# Patient Record
Sex: FEMALE | URBAN - METROPOLITAN AREA
[De-identification: names, ages, dates, MRNs, and addresses within clinical notes are randomized per-mention and may not be internally consistent; named-entity substitution may affect disease eponyms.]

---

## 2017-03-20 ENCOUNTER — AMBULATORY - HEALTHEAST (OUTPATIENT)
Dept: MULTI SPECIALTY CLINIC | Facility: CLINIC | Age: 27
End: 2017-03-20

## 2017-03-20 LAB
HPV_EXT - HISTORICAL: NORMAL
PAP SMEAR - HIM PATIENT REPORTED: NORMAL

## 2018-01-02 ENCOUNTER — RECORDS - HEALTHEAST (OUTPATIENT)
Dept: ADMINISTRATIVE | Facility: OTHER | Age: 28
End: 2018-01-02

## 2018-03-19 ENCOUNTER — RECORDS - HEALTHEAST (OUTPATIENT)
Dept: ADMINISTRATIVE | Facility: OTHER | Age: 28
End: 2018-03-19

## 2018-03-20 ENCOUNTER — COMMUNICATION - HEALTHEAST (OUTPATIENT)
Dept: ADMINISTRATIVE | Facility: CLINIC | Age: 28
End: 2018-03-20

## 2018-03-20 DIAGNOSIS — E05.90 HYPERTHYROIDISM: ICD-10-CM

## 2018-05-10 LAB
HGB_EXT (HISTORICAL CONVERSION): 11.1
PLT_EXT - HISTORICAL: 306

## 2018-05-18 ENCOUNTER — HOSPITAL ENCOUNTER (OUTPATIENT)
Dept: MEDSURG UNIT | Facility: CLINIC | Age: 28
Discharge: HOME | End: 2018-05-18
Attending: ADVANCED PRACTICE MIDWIFE | Admitting: ADVANCED PRACTICE MIDWIFE

## 2018-07-06 LAB — GBS_EXT (HISTORICAL CONVERSION): NEGATIVE

## 2018-07-10 ENCOUNTER — COMMUNICATION - HEALTHEAST (OUTPATIENT)
Dept: OBGYN | Facility: CLINIC | Age: 28
End: 2018-07-10

## 2018-07-10 ENCOUNTER — COMMUNICATION - HEALTHEAST (OUTPATIENT)
Dept: SCHEDULING | Facility: CLINIC | Age: 28
End: 2018-07-10

## 2018-07-16 ENCOUNTER — HOSPITAL ENCOUNTER (OUTPATIENT)
Dept: MEDSURG UNIT | Facility: CLINIC | Age: 28
Discharge: HOME | End: 2018-07-16
Attending: ADVANCED PRACTICE MIDWIFE | Admitting: ADVANCED PRACTICE MIDWIFE

## 2018-07-16 LAB
ABO/RH(D): NORMAL
ALBUMIN UR-MCNC: NEGATIVE MG/DL
ALT SERPL W P-5'-P-CCNC: 18 U/L (ref 0–45)
ANTIBODY SCREEN: NORMAL
APTT PPP: 25 SECONDS (ref 24–37)
AST SERPL W P-5'-P-CCNC: 20 U/L (ref 0–40)
CREAT SERPL-MCNC: 0.58 MG/DL (ref 0.6–1.1)
CREAT UR-MCNC: 21.8 MG/DL
ERYTHROCYTE [DISTWIDTH] IN BLOOD BY AUTOMATED COUNT: 13.4 % (ref 11–14.5)
GFR SERPL CREATININE-BSD FRML MDRD: >60 ML/MIN/1.73M2
GLUCOSE UR STRIP-MCNC: NEGATIVE MG/DL
HCT VFR BLD AUTO: 30.6 % (ref 35–47)
HGB BLD-MCNC: 10.5 G/DL (ref 12–16)
INR PPP: 0.9 (ref 0.9–1.1)
KETONES UR STRIP-MCNC: NEGATIVE MG/DL
MCH RBC QN AUTO: 30.8 PG (ref 27–34)
MCHC RBC AUTO-ENTMCNC: 34.3 G/DL (ref 32–36)
MCV RBC AUTO: 90 FL (ref 80–100)
PLATELET # BLD AUTO: 314 THOU/UL (ref 140–440)
PMV BLD AUTO: 9.6 FL (ref 8.5–12.5)
PROTEIN, RANDOM URINE - HISTORICAL: <7 MG/DL
PROTEIN/CREAT RATIO, RANDOM UR: NORMAL
RBC # BLD AUTO: 3.41 MILL/UL (ref 3.8–5.4)
URATE SERPL-MCNC: 3.3 MG/DL (ref 2–7.5)
WBC: 13 THOU/UL (ref 4–11)

## 2018-08-13 ENCOUNTER — COMMUNICATION - HEALTHEAST (OUTPATIENT)
Dept: OBGYN | Facility: CLINIC | Age: 28
End: 2018-08-13

## 2019-01-03 ENCOUNTER — RECORDS - HEALTHEAST (OUTPATIENT)
Dept: ADMINISTRATIVE | Facility: OTHER | Age: 29
End: 2019-01-03

## 2019-01-30 ENCOUNTER — RECORDS - HEALTHEAST (OUTPATIENT)
Dept: ADMINISTRATIVE | Facility: OTHER | Age: 29
End: 2019-01-30

## 2019-05-08 ENCOUNTER — RECORDS - HEALTHEAST (OUTPATIENT)
Dept: ADMINISTRATIVE | Facility: OTHER | Age: 29
End: 2019-05-08

## 2019-05-22 ENCOUNTER — RECORDS - HEALTHEAST (OUTPATIENT)
Dept: ADMINISTRATIVE | Facility: OTHER | Age: 29
End: 2019-05-22

## 2019-05-22 LAB
ABORH_EXT (HISTORICAL CONVERSION): NORMAL
ANTIBODY_EXT (HISTORICAL CONVERSION): NEGATIVE
HBSAG_EXT (HISTORICAL CONVERSION): NEGATIVE
HGB_EXT (HISTORICAL CONVERSION): 10.3
HIV_EXT: NEGATIVE
PLT_EXT - HISTORICAL: 287
RPR - HISTORICAL: NORMAL
RUBELLA_EXT (HISTORICAL CONVERSION): NORMAL

## 2019-05-30 ENCOUNTER — RECORDS - HEALTHEAST (OUTPATIENT)
Dept: ADMINISTRATIVE | Facility: OTHER | Age: 29
End: 2019-05-30

## 2019-07-02 ENCOUNTER — RECORDS - HEALTHEAST (OUTPATIENT)
Dept: ADMINISTRATIVE | Facility: OTHER | Age: 29
End: 2019-07-02

## 2019-07-10 ENCOUNTER — RECORDS - HEALTHEAST (OUTPATIENT)
Dept: ADMINISTRATIVE | Facility: OTHER | Age: 29
End: 2019-07-10

## 2019-07-30 ENCOUNTER — RECORDS - HEALTHEAST (OUTPATIENT)
Dept: ADMINISTRATIVE | Facility: OTHER | Age: 29
End: 2019-07-30

## 2019-08-03 ENCOUNTER — RECORDS - HEALTHEAST (OUTPATIENT)
Dept: ADMINISTRATIVE | Facility: OTHER | Age: 29
End: 2019-08-03

## 2019-08-03 ENCOUNTER — COMMUNICATION - HEALTHEAST (OUTPATIENT)
Dept: SCHEDULING | Facility: CLINIC | Age: 29
End: 2019-08-03

## 2019-08-08 ENCOUNTER — RECORDS - HEALTHEAST (OUTPATIENT)
Dept: ADMINISTRATIVE | Facility: OTHER | Age: 29
End: 2019-08-08

## 2019-09-16 ENCOUNTER — RECORDS - HEALTHEAST (OUTPATIENT)
Dept: ADMINISTRATIVE | Facility: OTHER | Age: 29
End: 2019-09-16

## 2019-09-16 LAB
ABSCREEN_EXT - HISTORICAL: NEGATIVE
HGB_EXT (HISTORICAL CONVERSION): 10.7
PLT_EXT - HISTORICAL: 329

## 2019-10-07 ENCOUNTER — RECORDS - HEALTHEAST (OUTPATIENT)
Dept: ADMINISTRATIVE | Facility: OTHER | Age: 29
End: 2019-10-07

## 2019-10-24 ENCOUNTER — RECORDS - HEALTHEAST (OUTPATIENT)
Dept: ADMINISTRATIVE | Facility: OTHER | Age: 29
End: 2019-10-24

## 2019-11-04 ENCOUNTER — RECORDS - HEALTHEAST (OUTPATIENT)
Dept: ADMINISTRATIVE | Facility: OTHER | Age: 29
End: 2019-11-04

## 2019-11-11 ENCOUNTER — COMMUNICATION - HEALTHEAST (OUTPATIENT)
Dept: ADMINISTRATIVE | Facility: CLINIC | Age: 29
End: 2019-11-11

## 2019-11-18 ENCOUNTER — PRENATAL OFFICE VISIT - HEALTHEAST (OUTPATIENT)
Dept: MIDWIFE SERVICES | Facility: CLINIC | Age: 29
End: 2019-11-18

## 2019-11-18 DIAGNOSIS — Z87.59 HISTORY OF KIDNEY INFECTION DURING PREGNANCY: ICD-10-CM

## 2019-11-18 DIAGNOSIS — Z87.440 HISTORY OF KIDNEY INFECTION DURING PREGNANCY: ICD-10-CM

## 2019-11-18 DIAGNOSIS — O21.9 NAUSEA/VOMITING IN PREGNANCY: ICD-10-CM

## 2019-11-18 DIAGNOSIS — Z67.91 RH NEGATIVE STATE IN ANTEPARTUM PERIOD: ICD-10-CM

## 2019-11-18 DIAGNOSIS — R82.5 POSITIVE URINE DRUG SCREEN: ICD-10-CM

## 2019-11-18 DIAGNOSIS — A60.00 GENITAL HERPES SIMPLEX, UNSPECIFIED SITE: ICD-10-CM

## 2019-11-18 DIAGNOSIS — Z34.83 ENCOUNTER FOR SUPERVISION OF OTHER NORMAL PREGNANCY IN THIRD TRIMESTER: ICD-10-CM

## 2019-11-18 DIAGNOSIS — F41.8 DEPRESSION WITH ANXIETY: ICD-10-CM

## 2019-11-18 DIAGNOSIS — Z87.59 HISTORY OF SEVERE PRE-ECLAMPSIA: ICD-10-CM

## 2019-11-18 DIAGNOSIS — E05.90 HYPERTHYROIDISM: ICD-10-CM

## 2019-11-18 DIAGNOSIS — Z34.80 SUPERVISION OF OTHER NORMAL PREGNANCY, ANTEPARTUM: ICD-10-CM

## 2019-11-18 DIAGNOSIS — O26.899 RH NEGATIVE STATE IN ANTEPARTUM PERIOD: ICD-10-CM

## 2019-11-18 LAB — HGB BLD-MCNC: 10.6 G/DL (ref 12–16)

## 2019-11-18 RX ORDER — ECHINACEA 400 MG
CAPSULE ORAL
Status: SHIPPED | COMMUNITY
Start: 2019-11-18

## 2019-11-18 RX ORDER — VALACYCLOVIR HYDROCHLORIDE 500 MG/1
500 TABLET, FILM COATED ORAL
Status: SHIPPED | COMMUNITY
Start: 2017-07-14

## 2019-11-18 ASSESSMENT — MIFFLIN-ST. JEOR: SCORE: 1310.09

## 2019-11-18 ASSESSMENT — EDINBURGH POSTNATAL DEPRESSION SCALE (EPDS): TOTAL SCORE: 6

## 2019-11-19 ENCOUNTER — COMMUNICATION - HEALTHEAST (OUTPATIENT)
Dept: MIDWIFE SERVICES | Facility: CLINIC | Age: 29
End: 2019-11-19

## 2019-11-19 LAB
ALLERGIC TO PENICILLIN: NO
BACTERIA SPEC CULT: NO GROWTH
C TRACH DNA SPEC QL PROBE+SIG AMP: NEGATIVE
COLLECTION METHOD: NORMAL
GP B STREP DNA SPEC QL NAA+PROBE: NEGATIVE
HCV AB SERPL QL IA: NEGATIVE
LEAD BLD-MCNC: NORMAL UG/DL
N GONORRHOEA DNA SPEC QL NAA+PROBE: NEGATIVE

## 2019-11-20 LAB — LEAD BLDV-MCNC: <2 UG/DL (ref 0–4.9)

## 2019-11-26 ENCOUNTER — PRENATAL OFFICE VISIT - HEALTHEAST (OUTPATIENT)
Dept: MIDWIFE SERVICES | Facility: CLINIC | Age: 29
End: 2019-11-26

## 2019-11-26 DIAGNOSIS — Z34.83 ENCOUNTER FOR SUPERVISION OF OTHER NORMAL PREGNANCY IN THIRD TRIMESTER: ICD-10-CM

## 2019-11-26 DIAGNOSIS — E05.90 HYPERTHYROIDISM: ICD-10-CM

## 2019-11-26 DIAGNOSIS — O99.013 ANEMIA AFFECTING PREGNANCY IN THIRD TRIMESTER: ICD-10-CM

## 2019-11-26 DIAGNOSIS — F19.91 HISTORY OF DRUG USE: ICD-10-CM

## 2019-11-26 LAB
AMPHETAMINES UR QL SCN: NORMAL
BARBITURATES UR QL: NORMAL
BENZODIAZ UR QL: NORMAL
CANNABINOIDS UR QL SCN: NORMAL
COCAINE UR QL: NORMAL
CREAT UR-MCNC: 54.4 MG/DL
METHADONE UR QL SCN: NORMAL
OPIATES UR QL SCN: NORMAL
OXYCODONE UR QL: NORMAL
PCP UR QL SCN: NORMAL

## 2019-11-26 ASSESSMENT — MIFFLIN-ST. JEOR: SCORE: 1323.7

## 2020-01-05 ENCOUNTER — COMMUNICATION - HEALTHEAST (OUTPATIENT)
Dept: MIDWIFE SERVICES | Facility: CLINIC | Age: 30
End: 2020-01-05

## 2020-01-05 DIAGNOSIS — A60.00 GENITAL HERPES SIMPLEX, UNSPECIFIED SITE: ICD-10-CM

## 2021-06-01 VITALS — BODY MASS INDEX: 20.94 KG/M2 | WEIGHT: 122 LBS

## 2021-06-03 VITALS
HEIGHT: 63 IN | HEART RATE: 84 BPM | BODY MASS INDEX: 24.45 KG/M2 | SYSTOLIC BLOOD PRESSURE: 118 MMHG | WEIGHT: 138 LBS | DIASTOLIC BLOOD PRESSURE: 64 MMHG

## 2021-06-03 NOTE — PATIENT INSTRUCTIONS - HE
[facility] Nurse Midwives - Contact information:  Appointment line and to get a hold of CNM in clinic Monday-Friday 8 am - 5 pm:  [phone].  There are some clinics with early start times (1st appointment 7:40 am) and others with evening hours (last appointment 6:20 pm).  Most are typically open from 8 am to 5 pm.    CNM on call answering service: [phone].  Specify your hospital of choice and leave a brief message for CNM;  will then page CNM who is on call at your specified hospital and you should receive a call back with 15 minutes.  Be sure that your ringer is audible and that you can accept blocked calls so that we can get back in touch with you! This number should be reserved for urgent needs if during the day, before 8 am, after 5 pm, weekends, holidays.    Contact the on-call CNM with warning signs, such as:    vaginal bleeding     Vaginal discharge and itching or pain and burning during urination    Leg/calf pain or swelling on one side    severe abdominal pain    nausea and vomiting more than 4-5 times a day, or if you are unable to keep anything down    fever more than 100.4 degrees F.         You are invited to  Meet the [facility] Nurse-Midwives  A way to tour the hospital Labor and Delivery unit and meet the midwives that attend births since you may not have the opportunity to meet them during your prenatal care.  Some sessions are informal meet and greet type social hours, others address a specific concern or topic.    Thursday, Februrary 22, 2018 7-8pm  [facility]  Social Hour    Thursday, April 19, 2018 7-8pm  [facility]  Exercise, nutrition and weight gain    Tuesday, June 28, 2018 7-8pm  [facility]  Postpartum depression/anxiety    Thursday August 23, 2018 7-8 pm  [facility], [facility]  Physiology of birth and breastfeeding, Pediatrician presentation    Thursday October 18,  2018  7-8pm,  [facility], [facility]  Social Hour    Please call [phone] to register        Touring the Maternity Care Center  To schedule a tour at either [place] or [facility], please do so online using the following links:  [facility] - https://www.CivilisedMoney/registerlist.asp?s=6&m=303&vs=5&p=2&bdzcp=644&ps=1&group=37&it=1&bcw=050  [facility] - https://www.CivilisedMoney/registerlist.asp?s=6&m=303&vs=5&p=2&ctwfk=759&ps=1&group=38&it=1&pdb=476    Childbirth and Parenting Education:   [facility]: http://DriverdoManhattan Eye, Ear and Throat HospitalIntellinX/   [phone] [phone]  [place]: (education, yoga & wellness) www.N-Dimension [employer]  Enlightened Mama: www.VidPayenedmama.Pre[employer]se Technologies   Childbirth collective: (Parent topic nights)  www.childbirthcollective.org/  Hypnobabies:  www.hypnobabiestwincities.com/  Hypnobirthing:  Http://hypnobirthing.com/    Breastfeeding Information:  An excellent 15-minute video on preparing for a good breastfeeding experience, including how to ensure you have a good milk supply and a comfortable latch, can be found here:  https://MakerCraft.[employer]/      Book Recommendations:   Yessica Cristela's Birthing From [facility]--first few chapters include a new-age tone, you may prefer to skip it and keep going, because there is good stuff later.  This book recommendation covers emotional preparation, but does cover coping with pain, and use of both pharmacological and nonpharmacological methods.    Dr. Prieto' The Pregnancy Book and The Birth Book--the pregnancy book goes month-by month    Womanly Art of Breastfeeding by La Leche [employer] International   Bestfeeding by Adenike Carter--great pictures    Mothering Your Nursing Toddler, by Maggie Wilson.   Addresses dealing with so many of the challenging behaviors of a nursing toddler.  How Weaning Happens, by La Leche League.  Discusses weaning at all ages, from medically necessary weaning of an infant, all the way up to age 5 (or  "older), with why/why not, and strategies.  Very empowering book both for deciding to wean and deciding not to.    American College of Nurse-Midwives (ACNM) http://www.midwife.org/; look at the informational handouts at http://www.midwife.org/Share-With-Women     www.mymidwife.org    Mother to Baby (Medication and Herbal guidance in pregnancy): http://www.mothertobaby.org  Toll-Free Hotline: 740.950.9856  LactMed (Medication use while breastfeeding): http://toxnet.nlm.nih.gov/newtoxnet/lactmed.htm    Women's Health.gov:  http://www.womenshealth.gov/a-z-topics/index.html    American pregnancy association - http://americanpregnancy.org    Centering Pregnancy (group prenatal care option): http://centeringhealthcare.org    Information about doulas:  Childbirth collective: http://www.childbirthcollective.org/  Doulas of [place] (DILAN):  www.dilan.org  ShareThis [facility]  project: http://Telemedicine Cliniccitiesdoulaproject.com/     Early Childhood and Family Education (ECFE):  ECFE offers parents hands-on learning experiences that will nourish a lifetime of teachable moments.  http://ecfe.info/ecfe-home/     [place] www.Qzzr.Allurent     FDA - Nutrition  www.mypyramid.gov  Under \"For Consumers,\" click on \"pregnant and breastfeeding women.\"      Centers for Disease Control and Prevention (CDC) - Vaccines : http://www.cdc.gov/vaccines/       When researching information on the web, question the validity of websites.  The domains .gov, .edu and.org tend to be more reliable information.  If there are a lot of advertisements, be cautious of the information provided. Stay away from blogs and chat rooms please!      Screening Program  Http://www.health.[facility].[place].us/newbornscreening/  [place] newborns are tested soon after birth for more than 50 hidden, rare disorders, including hearing loss and critical congenital heart disease (CCHD). This site provides resources and information for families and providers.    HEALTHY " PREGNANCY CARE: 37 to 41 WEEKS PREGNANT    Talk with your midwife or physician about when to call with signs of labor    Regular uterine contractions that are getting closer together and/or stronger    If you think your water has broken or is leaking    Bleeding from the vagina like a period (bloody vaginal discharge is normal)    If you are not feeling your baby move    Make plans for transportation and  as needed for when you are going to the hospital.    Your midwife or physician may offer to check your cervix for changes.     Ask your health care provider about vaccinations you may need following delivery. By now, you should have received a Tdap immunization to protect against pertussis or whooping cough. Fathers and family members who will be in close contact with the baby should also receive a Tdap shot at least two weeks before the expected birth of the baby if they have not had a Td (tetanus) shot for at least two years.    If you are past your due date, discuss the next steps leading to delivery with your midwife or physician. If you don't start labor on your own by 41 or 42 weeks, your midwife or physician may recommend giving you medicines to ripen your cervix and start labor.  Induction of labor: http://onlinelibrary.callejas.com/store/10.1016/j.jmwh.2008.04.018/asset/j.jmwh.2008.04.018.pdf?v=1&t=misd0lhe&w=63fe105q9gm11n16v0l8rt9w520340v3gf1xa688    Preparing for your baby: Tell your midwife or physician how you plan to feed your baby (breast or bottle), who you have chosen to do pediatric care for your baby, and if you have a boy, whether you have chosen to have him circumcised. You will need a car seat correctly installed in your vehicle to bring your baby home. As you start to set up the nursery at home for your baby, make sure the crib is safe. The mattress needs to fit snugly against the edges of the crib. If you can fit a soda can between the bars, they are too far apart and can allow the  baby's head to caught between them.    Learn about infant care and feeding, including information about infant CPR. We recommend that you put your baby to sleep on his or her back to reduce the chance of Sudden Infant Death Syndrome (SIDS). To maintain a healthy environment in which your child can grow, it's best to keep your home smoke-free. By preparing ahead, your transition into parenthood will go smoothly for you and your baby.    Your midwife or physician will want to see you for a checkup 2 to 6 weeks after delivery.    If you have questions about any symptoms you are experiencing or any other concerns, call your provider or their clinic staff at [facility] at Phone: [phone]. If it's after clinic hours, physician patients should call the Care Connection at [phone] ([phone]); midwife patients should call their answering service at [phone].    How can you care for yourself at home?   You can refer to the Starting Out Right book or find it online at http://www.healtheast.org/images/stories/maternity/[facility]-Starting-Out-Right.pdf or http://www.[facility]Nor[facility].org/images/stories/flipbooks/[facility]-starting-out-right/[facility]-starting-out-right.html#p=8     You can sign up for a weekly parenting e-mail that gives support, tips and advice from health care professionals that starts with pregnancy and continues through the toddler years. To register, go to www.healthNor-[facility].org/baby at any time during your pregnancy.        Making Plans for Feeding My Baby    By this point, you probably have read a lot about feeding your baby.  Breastfeed or formula? Each mother s decision is her own and [facility] respects you and your choices. We ve gathered information on both breastfeeding and formula feeding to help with your decision. Talking with your physician or nurse-midwife can also help in your decision.  However you plan to feed your baby, [facility] Maternity Care Centers encourage  rooming in with your baby, skin-to-skin contact and feeding your baby based on his or her cues.    Skin-to-skin contact  Being close to mom helps your baby adjust to life outside of the womb.  It helps your baby regulate their body temperature, heart rate, and breathing.  Your baby will usually be placed skin-to-skin immediately following birth or as soon as possible, if medical intervention is needed.    Rooming-In  Having your baby stay with you in your room is called  rooming-in .  Keeping your baby in your room helps you to learn how to care for your baby by getting to know your baby s cues, body rhythms and sleep cycle.       Cue-based feeding  Cues (signals) are baby s way of telling you what he or she wants.  When you learn your infant s cues, you know how to care for and feed your baby.   Feeding cues are the licking and smacking of lips, bringing their fist to their mouth, and a reflex called  rooting - where baby turns and opens his or her mouth, searching for the breast or bottle.  Crying is a late feeding cue.  Babies can feed frequently, often at least 8 times in 24 hours.    Breastfeeding facts  Breast milk is the best source of nutrition for your baby and is available at birth. In the first couple of days, your milk volume is already starting to increase, though it may not be noticeable. Breastfeed frequently to increase your milk supply. Within three to five days, you will begin to notice larger milk volumes. An increase in breast size, heaviness and firmness are often described as the milk  coming in.  Frequent breastfeeding can help breasts from getting overly firm and painful. You will know the baby is getting enough milk if your baby is having wet and dirty diapers and gaining weight.     If your goal is to exclusively breastfeed, it is important to not use any formula or artificial nipples (including bottles and pacifiers) while your baby is learning to breastfeed.  While it may seem like an   easy  option to give your baby a bottle, formula should only be given if there is a medical reason for your baby to have it.      Positioning and attachment   Get comfortable.  Use pillows as needed to support your arms and baby.  Hold baby close at the level of your breast, facing you in a tummy to tummy position.  Skin to skin helps with this.  Position the baby with his or her nose by the nipple.  There should be a straight line from baby s ear to shoulder to hips.  Tickle your baby s lips or wait for baby to open mouth wide, bring baby to breast by leading with the chin.  Aim the nipple at the roof of baby s mouth.  A rapid sucking pattern is followed by longer, drawing pattern with occasional swallows heard.  When baby is correctly latched, your nipple and much of the areola are pulled well into baby s mouth.      Returning to work or school  Focus on a good start to breastfeeding.  Many women continue to provide breastmilk for their baby when they return to work or school.  Making plans about where to pump and store milk can make the transition go well.  Talk with other mothers who have also returned to work or school for tips and support.  Your employer s Human Resource department may be a resource as well.     Returning to work or school: (continued)     babies can mean fewer  sick  days for you.    A quality breast pump will also save time and add comfort.  Check with your insurance prior to giving birth for breast pump coverage.  Many insurance companies include a pump within your benefits.    Wait until your baby is at least three weeks old to introduce a bottle for the first time.  Have someone besides you give the bottle.    Breastfeed when you are with your baby. Reserve your bottles of breast milk for when you are away.     Your breasts will need to be  emptied  either by your baby or a pump.  Plan to pump at least twice in an eight hour day.    If you cannot pump at work, continue  breastfeeding at home. Any amount of breast milk is worth giving to your baby.    Formula feeding facts  If you are planning to use formula to feed your baby, you will want to make some preparations ahead of time. Talk to your doctor or nurse-midwife about what type of formula to use. Some are iron-fortified, meaning they have extra iron in them. You will want to purchase formula and bottles before your baby is born to be sure you are ready after you return from the hospital. The [facility] do not provide formula samples to take home.    Be sure to follow formula mixing directions closely. Regular milk in the dairy case at the grocery store should not be given to babies under 1 year old. Baby formula is sold in several forms including:    Ready-to-use. This is the most expensive, but no mixing is necessary.    Concentrated liquid. This is less expensive than ready-to-use and you mix with water.    Powder. This is the least expensive. You mix one level scoop of powdered formula with two ounces of water and stir well.    Most babies need 2.5 ounces of formula per pound of body weight each day. This means an 8-pound baby may drink about 20 ounces of formula a day; however, this is just an estimate. The most important thing is to pay attention to your baby s cues.  If your baby is always fussy, needs more iron or has certain food allergies, your physician may suggest you change your baby s formula to a different kind.     How do I warm my baby s bottles?  You may feed your baby a bottle without warming it first. It is OK for the breast milk or formula to be cool or room temperature. If your baby seems to prefer it warmed, you can put the filled bottle in a container of warm water and let it stand for a few minutes. Check the temperature of the liquid on your skin before feeding it to your baby; to be sure it isn t too hot. Do not heat bottles in the microwave. Microwaves heat food and liquids unevenly, and  this can cause hot spots that can burn your baby.    How do I clean and sterilize bottles?  Sterilize bottles and nipples before you use them for the first time. You can do this by putting them in boiling water for 5 minutes. After that first time, you can wash them in hot and soapy water. Rinse them carefully to be sure there is no soap left on them. You can also wash them in the .    Care Connection [phone] ([phone])    Baby Café    Pregnant and interested in breastfeeding?  Need answers to breastfeeding questions?  Want to help breastfeeding moms?  Already breastfeeding and want to meet other moms?    Join us at the Baby Café!    Baby Cafe is a free, drop-in service offering breast-feeding support for pregnant women, breast-feeding mothers and their families.  Come share tips and socialize with other mothers.  Babies and siblings are welcome (no childcare available).    Starting April 2018, Baby Café will be at 4 locations.  Please see below for the Baby Café closest to you!      [facility]  [address] [place] [address]  1st Wednesday: 10am-12pm    [facility]  [address] [place] [address]  3rd Wednesday 4-6pm    [facility]  [address] [place] [address]  4th Wednesday 10am-12:30pm    [facility]  [address] [place] [address]  4th Wednesdays: 4-6pm      Hmong, Omani, and [place]er which is may be available at some sites.    For more information, please contact: Alma [email or web address] [email or web address]. or [phone]

## 2021-06-03 NOTE — TELEPHONE ENCOUNTER
Previous clinic: didn't ask  [facility] CNM appointment date & location: 11/18 WBY  Weeks gestation at appointment: 36 wks  How will records be sent to the clinic?: faxed  Phone number where you may be reached: pt knows we must receive her records by EOD today 11/11/2019

## 2021-06-03 NOTE — PATIENT INSTRUCTIONS - HE
[facility] Nurse Midwives - Contact information:  Appointment line and to get a hold of CNM in clinic Monday-Friday 8 am - 5 pm:  [phone].  There are some clinics with early start times (1st appointment 7:40 am) and others with evening hours (last appointment 6:20 pm).  Most are typically open from 8 am to 5 pm.    CNM on call answering service: [phone].  Specify your hospital of choice and leave a brief message for CNM;  will then page CNM who is on call at your specified hospital and you should receive a call back with 15 minutes.  Be sure that your ringer is audible and that you can accept blocked calls so that we can get back in touch with you! This number should be reserved for urgent needs if during the day, before 8 am, after 5 pm, weekends, holidays.    Contact the on-call CNM with warning signs, such as:    vaginal bleeding     Vaginal discharge and itching or pain and burning during urination    Leg/calf pain or swelling on one side    severe abdominal pain    nausea and vomiting more than 4-5 times a day, or if you are unable to keep anything down    fever more than 100.4 degrees F.         You are invited to  Meet the [facility] Nurse-Midwives  A way to tour the hospital Labor and Delivery unit and meet the midwives that attend births since you may not have the opportunity to meet them during your prenatal care.  Some sessions are informal meet and greet type social hours, others address a specific concern or topic.    Thursday, Februrary 22, 2018 7-8pm  [facility]  Social Hour    Thursday, April 19, 2018 7-8pm  [facility] [place] [facility], [facility] A  Exercise, nutrition and weight gain    Tuesday, June 28, 2018 7-8pm  [facility]  Postpartum depression/anxiety    Thursday August 23, 2018 7-8 pm  [facility] [place] [facility], Auditorum A  Physiology of birth and breastfeeding, Pediatrician presentation    Thursday October 18,  2018  7-8pm,  [facility], Auditorium A  Social Hour    Please call [phone] to register        Touring the Maternity Care Center  To schedule a tour at either [place] or [facility], please do so online using the following links:  [facility] - [email or web address]  [facility] - [email or web address]    Childbirth and Parenting Education:   [facility]: http://CipherGraph NetworksConey Island HospitalJag.ag/   [phone] [phone]  [place]: (education, yoga & wellness) www.Medafor  Enlightened Mama: www.InviBoxenedmama.fypio   Childbirth collective: (Parent topic nights)  www.childbirthcollective.org/  Hypnobabies:  www.hypnobabiestwincities.com/  Hypnobirthing:  Http://hypnobirthing.com/    Breastfeeding Information:  An excellent 15-minute video on preparing for a good breastfeeding experience, including how to ensure you have a good milk supply and a comfortable latch, can be found here:  https://ChipRewards.fypio/      Book Recommendations:   Yessica Cristela's Birthing From [facility]--first few chapters include a new-age tone, you may prefer to skip it and keep going, because there is good stuff later.  This book recommendation covers emotional preparation, but does cover coping with pain, and use of both pharmacological and nonpharmacological methods.    Dr. Prieto' The Pregnancy Book and The Birth Book--the pregnancy book goes month-by month    Womanly Art of Breastfeeding by La Leche League International   Bestfeeding by Adenike Carter--great pictures    Mothering Your Nursing Toddler, by Maggie Wilson.   Addresses dealing with so many of the challenging behaviors of a nursing toddler.  How Weaning Happens, by La Leche League.  Discusses weaning at all ages, from medically necessary weaning of an infant, all the way up to age 5 (or  "older), with why/why not, and strategies.  Very empowering book both for deciding to wean and deciding not to.    American College of Nurse-Midwives (ACNM) http://www.midwife.org/; look at the informational handouts at http://www.midwife.org/Share-With-Women     www.mymidwife.org    Mother to Baby (Medication and Herbal guidance in pregnancy): http://www.mothertobaby.org  Toll-Free Hotline: 704.636.3649  LactMed (Medication use while breastfeeding): http://toxnet.nlm.nih.gov/newtoxnet/lactmed.htm    Women's Health.gov:  http://www.womenshealth.gov/a-z-topics/index.html    American pregnancy association - http://americanpregnancy.org    Centering Pregnancy (group prenatal care option): http://centeringhealthcare.org    Information about doulas:  Childbirth collective: http://www.childbirthcollective.org/  Doulas of [place] (DILAN):  www.dilan.org  [facility]  project: http://Clever Cloudcitiesdoulaproject.com/     Early Childhood and Family Education (ECFE):  ECFE offers parents hands-on learning experiences that will nourish a lifetime of teachable moments.  http://ecfe.info/ecfe-home/     [place] www.Gymtrack.[employer]     FDA - Nutrition  www.mypyramid.gov  Under \"For Consumers,\" click on \"pregnant and breastfeeding women.\"      Centers for Disease Control and Prevention (CDC) - Vaccines : http://www.cdc.gov/vaccines/       When researching information on the web, question the validity of websites.  The domains .gov, .edu and.org tend to be more reliable information.  If there are a lot of advertisements, be cautious of the information provided. Stay away from blogs and chat rooms please!      Screening Program  Http://www.health.[facility].[place].us/newbornscreening/  [place] newborns are tested soon after birth for more than 50 hidden, rare disorders, including hearing loss and critical congenital heart disease (CCHD). This site provides resources and information for families and providers.    HEALTHY " PREGNANCY CARE: 37 to 41 WEEKS PREGNANT    Talk with your midwife or physician about when to call with signs of labor    Regular uterine contractions that are getting closer together and/or stronger    If you think your water has broken or is leaking    Bleeding from the vagina like a period (bloody vaginal discharge is normal)    If you are not feeling your baby move    Make plans for transportation and  as needed for when you are going to the hospital.    Your midwife or physician may offer to check your cervix for changes.     Ask your health care provider about vaccinations you may need following delivery. By now, you should have received a Tdap immunization to protect against pertussis or whooping cough. Fathers and family members who will be in close contact with the baby should also receive a Tdap shot at least two weeks before the expected birth of the baby if they have not had a Td (tetanus) shot for at least two years.    If you are past your due date, discuss the next steps leading to delivery with your midwife or physician. If you don't start labor on your own by 41 or 42 weeks, your midwife or physician may recommend giving you medicines to ripen your cervix and start labor.  Induction of labor: http://onlinelibrary.callejas.com/store/10.1016/j.jmwh.2008.04.018/asset/j.jmwh.2008.04.018.pdf?v=1&t=havm1whc&q=[email or web address]    Preparing for your baby: Tell your midwife or physician how you plan to feed your baby (breast or bottle), who you have chosen to do pediatric care for your baby, and if you have a boy, whether you have chosen to have him circumcised. You will need a car seat correctly installed in your vehicle to bring your baby home. As you start to set up the nursery at home for your baby, make sure the crib is safe. The mattress needs to fit snugly against the edges of the crib. If you can fit a soda can between the bars, they are too far apart and can allow the  baby's head to caught between them.    Learn about infant care and feeding, including information about infant CPR. We recommend that you put your baby to sleep on his or her back to reduce the chance of Sudden Infant Death Syndrome (SIDS). To maintain a healthy environment in which your child can grow, it's best to keep your home smoke-free. By preparing ahead, your transition into parenthood will go smoothly for you and your baby.    Your midwife or physician will want to see you for a checkup 2 to 6 weeks after delivery.    If you have questions about any symptoms you are experiencing or any other concerns, call your provider or their clinic staff at [facility] MIDWIFERY at Phone: [phone]. If it's after clinic hours, physician patients should call the Care Connection at [phone] ([phone]); midwife patients should call their answering service at [phone].    How can you care for yourself at home?   You can refer to the Starting Out Right book or find it online at http://www.healtheast.org/images/stories/maternity/[facility]-Starting-Out-Right.pdf or http://www.healtheast.org/images/stories/flipbooks/[facility]-starting-out-right/[facility]-starting-out-right.html#p=8     You can sign up for a weekly parenting e-mail that gives support, tips and advice from health care professionals that starts with pregnancy and continues through the toddler years. To register, go to www.healthPres[facility].org/baby at any time during your pregnancy.        Making Plans for Feeding My Baby    By this point, you probably have read a lot about feeding your baby.  Breastfeed or formula? Each mother s decision is her own and [facility] respects you and your choices. We ve gathered information on both breastfeeding and formula feeding to help with your decision. Talking with your physician or nurse-midwife can also help in your decision.  However you plan to feed your baby, [facility] Maternity Care Centers encourage rooming in  with your baby, skin-to-skin contact and feeding your baby based on his or her cues.    Skin-to-skin contact  Being close to mom helps your baby adjust to life outside of the womb.  It helps your baby regulate their body temperature, heart rate, and breathing.  Your baby will usually be placed skin-to-skin immediately following birth or as soon as possible, if medical intervention is needed.    Rooming-In  Having your baby stay with you in your room is called  rooming-in .  Keeping your baby in your room helps you to learn how to care for your baby by getting to know your baby s cues, body rhythms and sleep cycle.       Cue-based feeding  Cues (signals) are baby s way of telling you what he or she wants.  When you learn your infant s cues, you know how to care for and feed your baby.   Feeding cues are the licking and smacking of lips, bringing their fist to their mouth, and a reflex called  rooting - where baby turns and opens his or her mouth, searching for the breast or bottle.  Crying is a late feeding cue.  Babies can feed frequently, often at least 8 times in 24 hours.    Breastfeeding facts  Breast milk is the best source of nutrition for your baby and is available at birth. In the first couple of days, your milk volume is already starting to increase, though it may not be noticeable. Breastfeed frequently to increase your milk supply. Within three to five days, you will begin to notice larger milk volumes. An increase in breast size, heaviness and firmness are often described as the milk  coming in.  Frequent breastfeeding can help breasts from getting overly firm and painful. You will know the baby is getting enough milk if your baby is having wet and dirty diapers and gaining weight.     If your goal is to exclusively breastfeed, it is important to not use any formula or artificial nipples (including bottles and pacifiers) while your baby is learning to breastfeed.  While it may seem like an  easy  option  to give your baby a bottle, formula should only be given if there is a medical reason for your baby to have it.      Positioning and attachment   Get comfortable.  Use pillows as needed to support your arms and baby.  Hold baby close at the level of your breast, facing you in a tummy to tummy position.  Skin to skin helps with this.  Position the baby with his or her nose by the nipple.  There should be a straight line from baby s ear to shoulder to hips.  Tickle your baby s lips or wait for baby to open mouth wide, bring baby to breast by leading with the chin.  Aim the nipple at the roof of baby s mouth.  A rapid sucking pattern is followed by longer, drawing pattern with occasional swallows heard.  When baby is correctly latched, your nipple and much of the areola are pulled well into baby s mouth.      Returning to work or school  Focus on a good start to breastfeeding.  Many women continue to provide breastmilk for their baby when they return to work or school.  Making plans about where to pump and store milk can make the transition go well.  Talk with other mothers who have also returned to work or school for tips and support.  Your employer s Human Resource department may be a resource as well.     Returning to work or school: (continued)     babies can mean fewer  sick  days for you.    A quality breast pump will also save time and add comfort.  Check with your insurance prior to giving birth for breast pump coverage.  Many insurance companies include a pump within your benefits.    Wait until your baby is at least three weeks old to introduce a bottle for the first time.  Have someone besides you give the bottle.    Breastfeed when you are with your baby. Reserve your bottles of breast milk for when you are away.     Your breasts will need to be  emptied  either by your baby or a pump.  Plan to pump at least twice in an eight hour day.    If you cannot pump at work, continue breastfeeding at home.  Any amount of breast milk is worth giving to your baby.    Formula feeding facts  If you are planning to use formula to feed your baby, you will want to make some preparations ahead of time. Talk to your doctor or nurse-midwife about what type of formula to use. Some are iron-fortified, meaning they have extra iron in them. You will want to purchase formula and bottles before your baby is born to be sure you are ready after you return from the hospital. The [facility] do not provide formula samples to take home.    Be sure to follow formula mixing directions closely. Regular milk in the dairy case at the grocery store should not be given to babies under 1 year old. Baby formula is sold in several forms including:    Ready-to-use. This is the most expensive, but no mixing is necessary.    Concentrated liquid. This is less expensive than ready-to-use and you mix with water.    Powder. This is the least expensive. You mix one level scoop of powdered formula with two ounces of water and stir well.    Most babies need 2.5 ounces of formula per pound of body weight each day. This means an 8-pound baby may drink about 20 ounces of formula a day; however, this is just an estimate. The most important thing is to pay attention to your baby s cues.  If your baby is always fussy, needs more iron or has certain food allergies, your physician may suggest you change your baby s formula to a different kind.     How do I warm my baby s bottles?  You may feed your baby a bottle without warming it first. It is OK for the breast milk or formula to be cool or room temperature. If your baby seems to prefer it warmed, you can put the filled bottle in a container of warm water and let it stand for a few minutes. Check the temperature of the liquid on your skin before feeding it to your baby; to be sure it isn t too hot. Do not heat bottles in the microwave. Microwaves heat food and liquids unevenly, and this can cause hot spots  that can burn your baby.    How do I clean and sterilize bottles?  Sterilize bottles and nipples before you use them for the first time. You can do this by putting them in boiling water for 5 minutes. After that first time, you can wash them in hot and soapy water. Rinse them carefully to be sure there is no soap left on them. You can also wash them in the .    Care Connection [phone] ([phone])    Baby Café    Pregnant and interested in breastfeeding?  Need answers to breastfeeding questions?  Want to help breastfeeding moms?  Already breastfeeding and want to meet other moms?    Join us at the Baby Café!    Baby Cafe is a free, drop-in service offering breast-feeding support for pregnant women, breast-feeding mothers and their families.  Come share tips and socialize with other mothers.  Babies and siblings are welcome (no childcare available).    Starting April 2018, Baby Café will be at 4 locations.  Please see below for the Baby Café closest to you!      [facility]  [address] [place] [address]  1st Wednesday: 10am-12pm    [address] [address]  3rd Wednesday 4-6pm    [facility]  [address] [place] [address]  4th Wednesday 10am-12:30pm    [facility]  [address] [place] [address]  4th Wednesdays: 4-6pm      Hmong, Puerto Rican, and Colombian which is may be available at some sites.    For more information, please contact: [email or web address] [email or web address]. or [phone]

## 2021-06-03 NOTE — PROGRESS NOTES
PRENATAL VISIT   TRANSFER OF CARE    Assessment / Impression     FORREST visit at 36w0d    Pregnancy complicated by: underweight, Rh negative blood type, genital herpes, closely spaced pregnancies  History of kidney infection in last pregnancy  History of severe preeclampsia in last pregnancy  History of hyperthyroid, normal thyroid labs in pregnancy    Plan:   -Prenatal records reviewed. Labs entered into chart by CL Alejo and verified by this writer.   -Additional labs: 19 urine drug screen positive for THC. 19 GC/CT negative.   -No GCT was done. Pt opted for 1 week of 4 times daily blood glucose testing, which was performed, all values were within normal limits.  -Additional labs today included: lead level, hep C, GC/CT (pt requests), GBS, hgb, Urine culture.   -Patient is interested in waterbirth.  Hep C drawn today. Needs consent signed.   -Reviewed prenatal care schedule for remainder of pregnancy.   -Optimal nutrition and weight gain discussed. Pregnancy weight gain of 28-40 lbs (BMI < 18.5) encouraged.   -Danger s/sx for this trimester reviewed with patient.  -[facility] CNM packet given and reviewed with patient.  -CNM services and hospital options reviewed; emergency and scheduling phone numbers given to patient.  -Return to clinic weekly until birth.     -Positive urine drug screen (+ THC at IOB) noted in patient's prenatal records after patient had left clinic..   Today pt denies drug use. Repeat urine drug screen not done. Consider at 37 week visit.  Also review importance of abstaining from drug use if patient desires water birth option.    Total time spent with patient: 45 minutes, >50% time spent counseling and coordinating care.  MEHRDAD Hill, CNM, IBCLC  [facility]  Midwifery      Subjective:    Temi Bermudez is a 29 y.o.  here today for her here today for her transfer of care visit. She is transferring care from [facility] due to a desire to birth  at [facility].  She had her first baby at [facility] under the care of the [place] women's care nurse midwives.  She had a good experience at [facility] but was not pleased with her prenatal care in general. She began her prenatal care on 10 weeks 6 days gestation. She had a total of 11 visits before transfer of care.Pre-pregnancy weight is reported/recorded as 91 lbs and pre-pregnancy BMI was 16.6.  Review of previous records show the following concerns/problems: Underweight, history of preeclampsia, history of kidney infection last pregnancy, Rh- blood type, genital herpes, closely spaced pregnancies depression/anxiety.    Safe in her relationship.     She is feeling well.  She reports regular fetal movements.  She denies regular painful contractions, bleeding, leaking.  She denies  labor warning signs.    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 BF 8 months.       Complications: Preeclampsia   1 SAB                Expected Date of Delivery: 19 based on first trimester ultrasound.  LMP was unknown.    Past Medical History:   Diagnosis Date     Abnormal Pap smear of cervix      Allergic      Anemia     iron sup     Asthma     dont use inhaler     Herpes     Valtrex started in July     Hypertension 2018    severe preeclampsia with last pregnancy     Kidney infection 2018     Kidney infection in mother during pregnancy 2018    Dx in second trimester     PID (pelvic inflammatory disease)      Varicella   "    Past Surgical History:   Procedure Laterality Date     DILATION AND CURETTAGE OF UTERUS       WISDOM TOOTH EXTRACTION       Social History     Tobacco Use     Smoking status: Never Smoker     Smokeless tobacco: Never Used   Substance Use Topics     Alcohol use: No     Drug use: No     Current Outpatient Medications   Medication Sig Dispense Refill     ELDERBERRY FRUIT ORAL Take by mouth.       ferrous sulfate (IRON ORAL) daily.       Lactobacillus rhamnosus GG (CULTURELLE) 10-15 Billion cell capsule Take 1 capsule by mouth daily.       prenatal no.52-iron-FA-dha (-PNV-DHA) 28 mg iron- 1 mg-200 mg cap Take 1 capsule by mouth.       acyclovir (ZOVIRAX) 400 MG tablet Take 1 tablet (400 mg total) by mouth 3 (three) times a day. 126 tablet 0     prenatal no115-iron-folic acid 29 mg iron- 1 mg Chew Chew daily.       UNKNOWN TO PATIENT Valtrex, supplements.       valACYclovir (VALTREX) 500 MG tablet Take 500 mg by mouth.       No current facility-administered medications for this visit.      No Known Allergies    Pregnancy Risk Factors: Currently unmarried, Significantly overweight or underweight, Every been treated for an emotional disturbance and Inappropriate weight loss or gain this preg    EPDS score today: 6    Review of Systems  General:  Denies problem  Eyes: Denies problem  Ears/Nose/Throat: Denies problem  Cardiovascular: Denies problem  Respiratory:  Denies problem  Gastrointestinal:  Denies problem  Genitourinary: Denies problem  Musculoskeletal:  Denies problem  Skin: Denies problem  Neurologic: Denies problem  Psychiatric: Denies problem  Endocrine: Denies problem  Heme/Lymphatic: Denies problem   Allergic/Immunologic: Denies problem       Objective:   Objective    Vitals:    11/18/19 1415   BP: 118/64   Pulse: 84   Weight: 138 lb (62.6 kg)   Height: 5' 3\" (1.6 m)       Physical Exam:    Exam performed at [facility] with ANISH Lanier on 5/22/19. All findings WNL and documented in prenatal records.     "

## 2021-06-03 NOTE — PROGRESS NOTES
Temi is here alone today. Feeling well.  Taking iron once daily.  Also taking acyclovir for HSV prophylaxis.  Noting increase in BH ctx and some are more painful than others.  Reassured that this is common in term pregnancy.  Discussed false vs early vs active labor.  Shares that she had a poor experience with pumping after last birth.  Asking about lactation help.  Has RX for breat pump.  Looking at getting a different pump than her Medela.  Thinks the flanges were the wrong size.  Suggested she bring pump with to hospital to have lactation assess sizing.  Explained lactation consultants available at [facility] clinic as well PP.  Temi states she has anxiety about leaving the house after baby is born.  Worried about germs and also the ability to take the  and 15mo out on her own.  We reviewed her thyroid disorder and she explains that her endocrinologist is with [facility] and had been checking TSH/T4, however they had been normal.  So, recommendation is to check both at 6 weeks PP.  Offered to do these labs here at 6 week PP check.  She accepts this.  Can notify endocrine if values are abnormal.  Is not on medication for subclinical hyperthyroidism.  Feeling daily movement, but explains this baby has not been as active as her other baby was.  Both she and her partner can illicit movement by pressing on her abdomen.  Advised to call [facility] with concerns about FM. She is hoping to go into labor on her own this time as well.  Signed WB consent form.  Was unable to complete last birth due to elevated BP.  Discussed signing consent does not obligate her to have a WB if she chooses to exit the tub or not enter at all.

## 2021-06-03 NOTE — TELEPHONE ENCOUNTER
Records except U/S reports available in [facility]. FORREST sheet filled out and put in midwife inbox for review.

## 2021-06-04 VITALS
SYSTOLIC BLOOD PRESSURE: 122 MMHG | HEART RATE: 100 BPM | WEIGHT: 141 LBS | HEIGHT: 63 IN | DIASTOLIC BLOOD PRESSURE: 68 MMHG | OXYGEN SATURATION: 99 % | BODY MASS INDEX: 24.98 KG/M2

## 2021-06-16 PROBLEM — F41.8 DEPRESSION WITH ANXIETY: Status: ACTIVE | Noted: 2019-11-18

## 2021-06-19 NOTE — PROGRESS NOTES
Patient came in complaining of seeing spots and constant headache that was worsening. Patient stated she had epigastric pain, no clonus, and normal reflexes on all extremities. HELLP panel drawn and urine spec. Sent. All labs WNL, protien creat ratio undetectable. Bp's 130's-140's/80's. Updated Ana Laura Guo, stated to discharge patient home with instructions to hydrate and follow up later this week. Patient has regular prenatal appointment scheduled this Wednesday.

## 2021-06-26 ENCOUNTER — HEALTH MAINTENANCE LETTER (OUTPATIENT)
Age: 31
End: 2021-06-26

## 2021-07-14 PROBLEM — Z67.91 RH NEGATIVE STATE IN ANTEPARTUM PERIOD: Status: RESOLVED | Noted: 2019-11-18 | Resolved: 2020-01-06

## 2021-07-14 PROBLEM — Z34.90 PREGNANT: Status: RESOLVED | Noted: 2018-08-08 | Resolved: 2019-11-18

## 2021-07-14 PROBLEM — O14.13 SEVERE PREECLAMPSIA, THIRD TRIMESTER: Status: RESOLVED | Noted: 2018-08-09 | Resolved: 2019-11-18

## 2021-07-14 PROBLEM — A60.00 HERPES, GENITAL: Status: RESOLVED | Noted: 2019-11-18 | Resolved: 2020-01-06

## 2021-07-14 PROBLEM — O14.90 PREECLAMPSIA: Status: RESOLVED | Noted: 2018-08-09 | Resolved: 2019-11-18

## 2021-07-14 PROBLEM — O21.9 NAUSEA/VOMITING IN PREGNANCY: Status: RESOLVED | Noted: 2019-11-18 | Resolved: 2020-01-06

## 2021-07-14 PROBLEM — O26.899 RH NEGATIVE STATE IN ANTEPARTUM PERIOD: Status: RESOLVED | Noted: 2019-11-18 | Resolved: 2020-01-06

## 2021-07-14 PROBLEM — Z34.80 SUPERVISION OF OTHER NORMAL PREGNANCY, ANTEPARTUM: Status: RESOLVED | Noted: 2019-11-18 | Resolved: 2019-11-18

## 2021-07-14 PROBLEM — Z34.83 ENCOUNTER FOR SUPERVISION OF OTHER NORMAL PREGNANCY IN THIRD TRIMESTER: Status: RESOLVED | Noted: 2019-11-18 | Resolved: 2020-01-06

## 2021-07-14 PROBLEM — O99.013 ANEMIA AFFECTING PREGNANCY IN THIRD TRIMESTER: Status: RESOLVED | Noted: 2019-11-19 | Resolved: 2020-01-06

## 2021-10-16 ENCOUNTER — HEALTH MAINTENANCE LETTER (OUTPATIENT)
Age: 31
End: 2021-10-16

## 2022-02-09 NOTE — TELEPHONE ENCOUNTER
RN triage  Patient states she has noticed for 3 days Left sided chest pain with breathing, coughing and yawning. She describes this as a heavy tightness   She is 21 weeks pregnant and has felt baby moving today.  She is now noticing tightness with breathing at rest, goes straight through shoulder up to neck.  Patient denies fever, dizziness, sweating. She is nauseated due to pregnancy but does not note any increase in this.  Gave disposition to call 911 EMS for immediate evaluation.  Patient agreed and stated she will call now.  Jess Castelan, RN  [facility] Triage Nurse  5:54 PM  8/3/2019          Reason for Disposition    [1] Chest pain lasts > 5 minutes AND [2] described as crushing, pressure-like, or heavy    Protocols used: CHEST PAIN-A-AH      
214

## 2022-07-17 ENCOUNTER — HEALTH MAINTENANCE LETTER (OUTPATIENT)
Age: 32
End: 2022-07-17

## 2022-09-25 ENCOUNTER — HEALTH MAINTENANCE LETTER (OUTPATIENT)
Age: 32
End: 2022-09-25

## 2023-08-05 ENCOUNTER — HEALTH MAINTENANCE LETTER (OUTPATIENT)
Age: 33
End: 2023-08-05